# Patient Record
Sex: FEMALE | Race: OTHER | NOT HISPANIC OR LATINO | URBAN - METROPOLITAN AREA
[De-identification: names, ages, dates, MRNs, and addresses within clinical notes are randomized per-mention and may not be internally consistent; named-entity substitution may affect disease eponyms.]

---

## 2018-10-24 ENCOUNTER — EMERGENCY (EMERGENCY)
Facility: HOSPITAL | Age: 28
LOS: 1 days | Discharge: HOME | End: 2018-10-24
Admitting: EMERGENCY MEDICINE
Payer: COMMERCIAL

## 2018-10-24 VITALS
HEART RATE: 92 BPM | TEMPERATURE: 98 F | DIASTOLIC BLOOD PRESSURE: 94 MMHG | RESPIRATION RATE: 18 BRPM | SYSTOLIC BLOOD PRESSURE: 138 MMHG | WEIGHT: 138.45 LBS | OXYGEN SATURATION: 100 %

## 2018-10-24 DIAGNOSIS — H57.89 OTHER SPECIFIED DISORDERS OF EYE AND ADNEXA: ICD-10-CM

## 2018-10-24 DIAGNOSIS — H11.31 CONJUNCTIVAL HEMORRHAGE, RIGHT EYE: ICD-10-CM

## 2018-10-24 PROCEDURE — 99282 EMERGENCY DEPT VISIT SF MDM: CPT

## 2018-10-24 NOTE — ED PROVIDER NOTE - EYES, MLM
Clear bilaterally, pupils equal, round and reactive to light. OD w/very small subconjunctival hemorrhage @ 3 o'clock, no fb, no ulcer no abrasion

## 2018-10-24 NOTE — ED ADULT NURSE NOTE - OBJECTIVE STATEMENT
Patient w/ bright red discoloration to sclerae to right eye, states noted it today, no pain or discharge, no blurring of vision, denies any trauma or injury.

## 2018-10-24 NOTE — ED PROVIDER NOTE - OBJECTIVE STATEMENT
The pt is a 29 y/o F, who presents to ED c/o R eye redness today. Denies injury, pain, visual changes, fb sensation, tearing

## 2018-10-24 NOTE — ED ADULT NURSE NOTE - NSIMPLEMENTINTERV_GEN_ALL_ED
Implemented All Universal Safety Interventions:  [place] to call system. Call bell, personal items and telephone within reach. Instruct patient to call for assistance. Room bathroom lighting operational. Non-slip footwear when patient is off stretcher. Physically safe environment: no spills, clutter or unnecessary equipment. Stretcher in lowest position, wheels locked, appropriate side rails in place.

## 2018-10-24 NOTE — ED PROVIDER NOTE - MEDICAL DECISION MAKING DETAILS
pt w/small, atraumatic subconjunctival hemorrhage, no eye pain, no visual changes, pt understands to f/u w/her eye doctor but will return for any worsening symptoms

## 2018-10-24 NOTE — ED ADULT NURSE NOTE - CHPI ED NUR SYMPTOMS NEG
no foreign body/no pain/no discharge/no purulent drainage/no blurred vision/no photophobia/no itching/no double vision/no drainage/no eye lid swelling

## 2018-10-24 NOTE — ED ADULT NURSE REASSESSMENT NOTE - NS ED NURSE REASSESS COMMENT FT1
Visual acuity done:  Right eye 20/20  Left eye 20/40 .  W/ eyeglasses/correction:  Right eye 20/20  Left eye 20/20 .